# Patient Record
Sex: FEMALE | Race: WHITE | Employment: UNEMPLOYED | URBAN - METROPOLITAN AREA
[De-identification: names, ages, dates, MRNs, and addresses within clinical notes are randomized per-mention and may not be internally consistent; named-entity substitution may affect disease eponyms.]

---

## 2022-11-07 ENCOUNTER — APPOINTMENT (OUTPATIENT)
Dept: GENERAL RADIOLOGY | Age: 52
End: 2022-11-07
Attending: EMERGENCY MEDICINE

## 2022-11-07 ENCOUNTER — HOSPITAL ENCOUNTER (EMERGENCY)
Age: 52
Discharge: HOME | End: 2022-11-07
Attending: EMERGENCY MEDICINE

## 2022-11-07 VITALS
HEART RATE: 66 BPM | DIASTOLIC BLOOD PRESSURE: 84 MMHG | RESPIRATION RATE: 16 BRPM | SYSTOLIC BLOOD PRESSURE: 175 MMHG | TEMPERATURE: 97.2 F | OXYGEN SATURATION: 98 %

## 2022-11-07 DIAGNOSIS — S52.092A CLOSED COMMINUTED FRACTURE OF PROXIMAL END OF LEFT ULNA, INITIAL ENCOUNTER: Primary | ICD-10-CM

## 2022-11-07 DIAGNOSIS — T14.8XXA ABRASION: ICD-10-CM

## 2022-11-07 PROCEDURE — 99283 EMERGENCY DEPT VISIT LOW MDM: CPT

## 2022-11-07 PROCEDURE — 73080 X-RAY EXAM OF ELBOW: CPT

## 2022-11-07 PROCEDURE — 74011000250 HC RX REV CODE- 250: Performed by: EMERGENCY MEDICINE

## 2022-11-07 PROCEDURE — 73110 X-RAY EXAM OF WRIST: CPT

## 2022-11-07 PROCEDURE — 73090 X-RAY EXAM OF FOREARM: CPT

## 2022-11-07 RX ORDER — DOXYCYCLINE HYCLATE 100 MG
100 TABLET ORAL 2 TIMES DAILY
Qty: 14 TABLET | Refills: 0 | Status: SHIPPED | OUTPATIENT
Start: 2022-11-07 | End: 2022-11-14

## 2022-11-07 RX ORDER — VANCOMYCIN 1.75 GRAM/500 ML IN 0.9 % SODIUM CHLORIDE INTRAVENOUS
1750 ONCE
Status: DISCONTINUED | OUTPATIENT
Start: 2022-11-07 | End: 2022-11-07

## 2022-11-07 RX ORDER — SILVER NITRATE 38.21; 12.74 MG/1; MG/1
1 STICK TOPICAL
Status: COMPLETED | OUTPATIENT
Start: 2022-11-07 | End: 2022-11-07

## 2022-11-07 RX ORDER — HYDROCODONE BITARTRATE AND ACETAMINOPHEN 5; 325 MG/1; MG/1
1 TABLET ORAL
Qty: 18 TABLET | Refills: 0 | Status: SHIPPED | OUTPATIENT
Start: 2022-11-07 | End: 2022-11-10

## 2022-11-07 RX ADMIN — Medication 1 APPLICATOR: at 15:21

## 2022-11-07 NOTE — ED TRIAGE NOTES
Pt tripped and fell landing on outstretched hand pta, abrasion noted to wrist, c/o left wrist pain and left elbow pain, +hematoma/deformity noted to left elbow, denies los, denies neck or back pain , +radial pulse

## 2022-11-07 NOTE — CONSULTS
ORTHOPAEDIC CONSULT NOTE    Subjective:     Date of Consultation:  November 7, 2022  Referring Physician:  Yudyjoshua Canales is a 45 y.o. female with past medical history significant for hypertension as well as depression and a distant history of left radial head fracture fixation is seen for left elbow pain, swelling and bleeding following a ground-level fall today. States that she was trying to throw a ball back to a child and somehow lost her footing falling onto her left side with all of her weight landing through her left elbow. She states that this was on asphalt. She had immediate pain in the elbow and noticed early swelling. She denies having debris in the wounds but does note some bleeding since this time. She denies tingling or numbness in her arm other than near the site of her injury as she has had ice on it consistently for several hours. She was brought to the emergency department and found to have a proximal ulnar fracture. We have been asked to see her for the same. She reports pain in her elbow region and proximal forearm but denies any radiation to her wrist or up into her shoulder. She denies tingling or numbness. She denies any fevers or chills. She states that she has been taking some Advil for her discomfort and states this has given her some relief. She has no established orthopedics care at this time. .     Patient Active Problem List    Diagnosis Date Noted    Hypertension 2016    Depression 2011    ADD (attention deficit disorder) 2007     Family History   Problem Relation Age of Onset    Mult Sclerosis Mother 66    Hypertension Father     High Cholesterol Father       Social History     Tobacco Use    Smoking status: Never    Smokeless tobacco: Never   Substance Use Topics    Alcohol use:  Yes     Alcohol/week: 7.0 standard drinks     Types: 7 Glasses of wine per week     Past Medical History:   Diagnosis Date    ADD (attention deficit disorder) 2007    adderall 20mg XR; Depression 2011    Prozac 20mg     Hypertension 2016    RESOLVED, after job change, and weight loss      Past Surgical History:   Procedure Laterality Date    HX BREAST AUGMENTATION  2006    HX CYST REMOVAL Right     Ganglion from foot    HX ORTHOPAEDIC Left 2018    ELBOW     HX TUBAL LIGATION  2017    HX WISDOM TEETH EXTRACTION        Prior to Admission medications    Medication Sig Start Date End Date Taking? Authorizing Provider   HYDROcodone-acetaminophen (Norco) 5-325 mg per tablet Take 1 Tablet by mouth every four (4) hours as needed for Pain for up to 3 days. Max Daily Amount: 6 Tablets. 11/7/22 11/10/22 Yes Freddie Clement PA   doxycycline (VIBRA-TABS) 100 mg tablet Take 1 Tablet by mouth two (2) times a day for 7 days. 11/7/22 11/14/22 Yes Prabhakar Clement PA   amphetamine-dextroamphetamine XR (ADDERALL XR) 25 mg XR capsule Take 1 Capsule by mouth every morning. Max Daily Amount: 25 mg. 10/31/22   Kishore Miranda PA   FLUoxetine (PROzac) 20 mg capsule Take 1 Capsule by mouth daily. 9/26/22   Kishore Miranda PA     No current facility-administered medications for this encounter. Current Outpatient Medications   Medication Sig    HYDROcodone-acetaminophen (Norco) 5-325 mg per tablet Take 1 Tablet by mouth every four (4) hours as needed for Pain for up to 3 days. Max Daily Amount: 6 Tablets. doxycycline (VIBRA-TABS) 100 mg tablet Take 1 Tablet by mouth two (2) times a day for 7 days. amphetamine-dextroamphetamine XR (ADDERALL XR) 25 mg XR capsule Take 1 Capsule by mouth every morning. Max Daily Amount: 25 mg. FLUoxetine (PROzac) 20 mg capsule Take 1 Capsule by mouth daily. Allergies   Allergen Reactions    Amoxicillin Rash        Review of Systems:  Pertinent items are noted in HPI.     Mental Status: no dementia    Objective:     Patient Vitals for the past 8 hrs:   BP Temp Pulse Resp SpO2   11/07/22 1202 (!) 175/84 97.2 °F (36.2 °C) 66 16 98 %     Temp (24hrs), Av.2 °F (36.2 °C), Min:97.2 °F (36.2 °C), Max:97.2 °F (36.2 °C)      EXAM: Patient is awake and alert sitting in chair; appears mildly uncomfortable; agreeable to exam  Examination of the left elbow reveals marked swelling along the proximal ulnar region; there are 2 half centimeter puncture wounds in this region the more proximal of the 2 having noted blood oozing; there is no debris noted in the field there is also skin abrasions noted throughout the region as well. She has some tenderness to palpation over the site. Fracture step-off can just be felt in the proximal ulnar region. Patient unable to actively extend arm without discomfort. Moves through wrist, hand and fingers to command. Distal sensory function grossly intact. Distal pulses palpable    Imaging Review:   EXAM: XR FOREARM LT AP/LAT,     EXAM: XR ELBOW LT MIN 3 V,     EXAM: XR WRIST LT AP/LAT/OBL MIN 3V     INDICATION: fall, swelling. COMPARISON: None. FINDINGS:     Left elbow, 3 views  Acute comminuted fracture through the trochlear notch of the ulna with marked  distraction of the major fracture fragments. Overall alignment of the elbow  joint is otherwise preserved, within the limits of suboptimal positioning. Screws and 2 internal fixation of radial head fracture. Extensive soft tissue  swelling and gas surrounding the elbow, most pronounced dorsally. Left forearm, 2 views  No additional acute fracture nor dislocation. Soft tissues otherwise grossly  unremarkable. Left wrist, 3 views  No acute fracture nor dislocation. Soft tissue swelling and gas volarly may be  posttraumatic. IMPRESSION  1. Acute comminuted distracted proximal ulnar fracture. Overall alignment is  maintained. 2.  Internally fixed radial head fracture. 3.  No additional acute fracture nor dislocation of the forearm or wrist.    Labs: No results found for this or any previous visit (from the past 24 hour(s)).       Impression:     Active Problems:    * No active hospital problems.  *      Plan:     Acute comminuted fracture of left proximal ulna    Fall injury does have some associated skin abrasions and puncture wounds this does not appear to be an open fracture wounds are clean without debris; there is some gas noted on imaging but this is likely related to the her puncture wounds from asphalt  Bleeding puncture wound was treated with silver nitrate stick  Skin and injury region thoroughly cleansed and bandaged with Adaptic and nonadherent dressing  Patient placed in posterior long-arm splint and sling for support and comfort  Patient will need outpatient follow-up with Dr. Iza Campbell for surgical management of her fracture  Pain medication as needed  Have instructed to keep arm elevated    Dr. Reyes Berkeley aware of patient and in agreement with current plan of care    Emil Hernandez PA-C  Orthopedic Trauma Service  [facility]

## 2022-11-07 NOTE — ED PROVIDER NOTES
Please note that this dictation was completed with [employer], the computer voice recognition software. Quite often unanticipated grammatical, syntax, homophones, and other interpretive errors are inadvertently transcribed by the computer software. Please disregard these errors. Please excuse any errors that have escaped final proofreading. Patient is a 13-year-old female with history of hypertension, ADD, depression, presenting to ED for evaluation of left arm and elbow pain after a fall. She states that she was out in her yard and tripped and fell, landing on her outstretched left arm. Denies head injury, loss of consciousness, or anticoagulants. States that 4 years ago she broke her forearm which required surgery, this was [place] in [place], has not followed locally with an orthopedist.       Past Medical History:   Diagnosis Date    ADD (attention deficit disorder) 2007    adderall 20mg XR;     Depression 2011    Prozac 20mg     Hypertension 2016    RESOLVED, after job change, and weight loss       Past Surgical History:   Procedure Laterality Date    HX BREAST AUGMENTATION  2006    HX CYST REMOVAL Right     Ganglion from foot    HX ORTHOPAEDIC Left 2018    ELBOW     HX TUBAL LIGATION  2017    HX WISDOM TEETH EXTRACTION           Family History:   Problem Relation Age of Onset    Mult Sclerosis Mother 66    Hypertension Father     High Cholesterol Father        Social History     Socioeconomic History    Marital status:      Spouse name: Not on file    Number of children: Not on file    Years of education: Not on file    Highest education level: Not on file   Occupational History    Occupation:  for moving company   Tobacco Use    Smoking status: Never    Smokeless tobacco: Never   Vaping Use    Vaping Use: Never used   Substance and Sexual Activity    Alcohol use:  Yes     Alcohol/week: 7.0 standard drinks     Types: 7 Glasses of wine per week    Drug use: Never    Sexual activity: Not Currently   Other Topics Concern    Not on file   Social History Narrative    Not on file     Social Determinants of Health     Financial Resource Strain: Not on file   Food Insecurity: Not on file   Transportation Needs: Not on file   Physical Activity: Not on file   Stress: Not on file   Social Connections: Not on file   Intimate Partner Violence: Not on file   Housing Stability: Not on file         ALLERGIES: Amoxicillin    Review of Systems   Constitutional:  Negative for chills and fever. HENT:  Negative for congestion, ear pain and sore throat. Eyes:  Negative for visual disturbance. Respiratory:  Negative for cough and shortness of breath. Cardiovascular:  Negative for chest pain. Gastrointestinal:  Negative for abdominal pain, diarrhea, nausea and vomiting. Genitourinary:  Negative for dysuria and flank pain. Musculoskeletal:  Positive for arthralgias and joint swelling. Negative for back pain. Skin:  Negative for color change. Neurological:  Negative for dizziness and headaches. Psychiatric/Behavioral:  Negative for confusion. Vitals:    11/07/22 1202   BP: (!) 175/84   Pulse: 66   Resp: 16   Temp: 97.2 °F (36.2 °C)   SpO2: 98%            Physical Exam  Vitals and nursing note reviewed. Constitutional:       General: She is not in acute distress. Appearance: Normal appearance. She is not ill-appearing. HENT:      Head: Normocephalic and atraumatic. Eyes:      General: Vision grossly intact. Extraocular Movements: Extraocular movements intact. Conjunctiva/sclera: Conjunctivae normal.   Neck:      Trachea: Phonation normal.   Cardiovascular:      Rate and Rhythm: Normal rate and regular rhythm. Heart sounds: Normal heart sounds. Pulmonary:      Effort: Pulmonary effort is normal.      Breath sounds: Normal breath sounds and air entry. Abdominal:      Palpations: Abdomen is soft. Tenderness: There is no abdominal tenderness.    Musculoskeletal: Left shoulder: Normal.      Left elbow: Swelling present. Normal range of motion. Left forearm: Swelling, deformity, laceration (Overlying deep abrasions to left proximal forearm, bleeding controlled) and tenderness present. Left wrist: Normal. No snuff box tenderness. Normal pulse. Skin:     General: Skin is warm and dry. Neurological:      General: No focal deficit present. Mental Status: She is alert and oriented to person, place, and time. Psychiatric:         Behavior: Behavior normal.            MDM  Number of Diagnoses or Management Options  Abrasion  Closed comminuted fracture of proximal end of left ulna, initial encounter  Diagnosis management comments: Patient is alert, afebrile, vital stable. Presents ambulatory after mechanical ground-level fall prior to arrival. Left FOOSH injury, has significant swelling and deformity to the left proximal forearm with overlying abrasion. See above photo. History of ORIF to this area 4 years ago, out of town. Large area of swelling with overlying wounds to the left proximal forearm, range of motion of elbow intact. X-rays consistent with a comminuted proximal ulnar fracture. Consult placed with orthopedics (Dr. Caryn Chery, [facility]) due to concern for open fracture, [facility] has evaluated patient in ER. Recommends that wounds are superficial and not concerning for open fracture, recommends splint, abx, and outpatient follow-up. Ortho PA has placed splint and sling. No neurovascular changes after application. ED Course as of 11/07/22 1440   Mon Nov 07, 2022   1435 XR ELBOW LT MIN 3 V  FINDINGS:     Left elbow, 3 views  Acute comminuted fracture through the trochlear notch of the ulna with marked  distraction of the major fracture fragments. Overall alignment of the elbow  joint is otherwise preserved, within the limits of suboptimal positioning. Screws and 2 internal fixation of radial head fracture.  Extensive soft tissue  swelling and gas surrounding the elbow, most pronounced dorsally. Left forearm, 2 views  No additional acute fracture nor dislocation. Soft tissues otherwise grossly  unremarkable. Left wrist, 3 views  No acute fracture nor dislocation. Soft tissue swelling and gas volarly may be  posttraumatic. IMPRESSION  1. Acute comminuted distracted proximal ulnar fracture. Overall alignment is  maintained. 2.  Internally fixed radial head fracture. 3.  No additional acute fracture nor dislocation of the forearm or wrist. [EP]      ED Course User Index  [EP] Radha Clement PA     4:11 PM  Pt has been reevaluated. There are no new complaints, changes, or physical findings at this time. All results have been reviewed with patient and/or family. Medications have been reviewed w/ pt and/or family. Pt and/or family's questions have been answered. Pt and/or family expressed good understanding of the dx/tx/rx and is in agreement with plan of care. Pt instructed and agreed to f/u w/ ortho and to return to ED upon further deterioration. Return precautions outlined. All questions answered at this time. Pt is stable and ready for discharge. IMPRESSION:  1. Closed comminuted fracture of proximal end of left ulna, initial encounter    2. Abrasion        PLAN:  1. Current Discharge Medication List        START taking these medications    Details   HYDROcodone-acetaminophen (Norco) 5-325 mg per tablet Take 1 Tablet by mouth every four (4) hours as needed for Pain for up to 3 days. Max Daily Amount: 6 Tablets. Qty: 18 Tablet, Refills: 0  Start date: 11/7/2022, End date: 11/10/2022    Associated Diagnoses: Closed comminuted fracture of proximal end of left ulna, initial encounter      doxycycline (VIBRA-TABS) 100 mg tablet Take 1 Tablet by mouth two (2) times a day for 7 days. Qty: 14 Tablet, Refills: 0  Start date: 11/7/2022, End date: 11/14/2022           2.    Follow-up Information       Follow up With Specialties Details Why Contact Info    Charlie Rosa MD Orthopedic Surgery Schedule an appointment as soon as possible for a visit   [phone] [phone]                 Return to ED if worse     Procedures

## 2022-11-08 ENCOUNTER — OFFICE VISIT (OUTPATIENT)
Dept: ORTHOPEDIC SURGERY | Age: 52
End: 2022-11-08

## 2022-11-08 VITALS — BODY MASS INDEX: 25.01 KG/M2 | HEIGHT: 68 IN | WEIGHT: 165 LBS

## 2022-11-08 DIAGNOSIS — S52.022A CLOSED FRACTURE OF OLECRANON PROCESS OF LEFT ULNA, INITIAL ENCOUNTER: Primary | ICD-10-CM

## 2022-11-08 PROCEDURE — 99203 OFFICE O/P NEW LOW 30 MIN: CPT | Performed by: ORTHOPAEDIC SURGERY

## 2022-11-10 ENCOUNTER — ANESTHESIA EVENT (OUTPATIENT)
Dept: MEDSURG UNIT | Age: 52
End: 2022-11-10

## 2022-11-10 ENCOUNTER — HOSPITAL ENCOUNTER (OUTPATIENT)
Age: 52
Setting detail: OUTPATIENT SURGERY
Discharge: HOME | End: 2022-11-10
Attending: ORTHOPAEDIC SURGERY | Admitting: ORTHOPAEDIC SURGERY

## 2022-11-10 ENCOUNTER — ANESTHESIA (OUTPATIENT)
Dept: MEDSURG UNIT | Age: 52
End: 2022-11-10

## 2022-11-10 VITALS
HEART RATE: 79 BPM | OXYGEN SATURATION: 96 % | HEIGHT: 68 IN | DIASTOLIC BLOOD PRESSURE: 88 MMHG | BODY MASS INDEX: 25.98 KG/M2 | SYSTOLIC BLOOD PRESSURE: 133 MMHG | WEIGHT: 171.4 LBS | RESPIRATION RATE: 16 BRPM | TEMPERATURE: 98.4 F

## 2022-11-10 DIAGNOSIS — S52.022A CLOSED FRACTURE OF OLECRANON PROCESS OF LEFT ULNA, INITIAL ENCOUNTER: Primary | ICD-10-CM

## 2022-11-10 PROCEDURE — 99024 POSTOP FOLLOW-UP VISIT: CPT | Performed by: ORTHOPAEDIC SURGERY

## 2022-11-10 PROCEDURE — 77030002986 HC SUT PROL J&J -A: Performed by: ORTHOPAEDIC SURGERY

## 2022-11-10 PROCEDURE — 74011250636 HC RX REV CODE- 250/636: Performed by: NURSE ANESTHETIST, CERTIFIED REGISTERED

## 2022-11-10 PROCEDURE — 77030040922 HC BLNKT HYPOTHRM STRY -A

## 2022-11-10 PROCEDURE — 76210000035 HC AMBSU PH I REC 1 TO 1.5 HR: Performed by: ORTHOPAEDIC SURGERY

## 2022-11-10 PROCEDURE — 74011000250 HC RX REV CODE- 250: Performed by: ORTHOPAEDIC SURGERY

## 2022-11-10 PROCEDURE — C1713 ANCHOR/SCREW BN/BN,TIS/BN: HCPCS | Performed by: ORTHOPAEDIC SURGERY

## 2022-11-10 PROCEDURE — 74011250636 HC RX REV CODE- 250/636: Performed by: ANESTHESIOLOGY

## 2022-11-10 PROCEDURE — 77030003601 HC NDL NRV BLK BBMI -A

## 2022-11-10 PROCEDURE — 77030031139 HC SUT VCRL2 J&J -A: Performed by: ORTHOPAEDIC SURGERY

## 2022-11-10 PROCEDURE — 77030000031 HC BIT DRL QC SYNT -C: Performed by: ORTHOPAEDIC SURGERY

## 2022-11-10 PROCEDURE — 76030000001 HC AMB SURG OR TIME 1 TO 1.5: Performed by: ORTHOPAEDIC SURGERY

## 2022-11-10 PROCEDURE — 74011250636 HC RX REV CODE- 250/636: Performed by: ORTHOPAEDIC SURGERY

## 2022-11-10 PROCEDURE — 76060000062 HC AMB SURG ANES 1 TO 1.5 HR: Performed by: ORTHOPAEDIC SURGERY

## 2022-11-10 PROCEDURE — 77030018673: Performed by: ORTHOPAEDIC SURGERY

## 2022-11-10 PROCEDURE — 74011000250 HC RX REV CODE- 250: Performed by: NURSE ANESTHETIST, CERTIFIED REGISTERED

## 2022-11-10 PROCEDURE — 77030040356 HC CORD BPLR FRCP COVD -A: Performed by: ORTHOPAEDIC SURGERY

## 2022-11-10 PROCEDURE — 24685 OPTX ULNAR FX PROX END W/FIX: CPT | Performed by: ORTHOPAEDIC SURGERY

## 2022-11-10 PROCEDURE — 2709999900 HC NON-CHARGEABLE SUPPLY: Performed by: ORTHOPAEDIC SURGERY

## 2022-11-10 PROCEDURE — A4565 SLINGS: HCPCS | Performed by: ORTHOPAEDIC SURGERY

## 2022-11-10 PROCEDURE — 77030010396 HC WRE FIX C CNMD -A: Performed by: ORTHOPAEDIC SURGERY

## 2022-11-10 PROCEDURE — 77030003862 HC BIT DRL SYNT -B: Performed by: ORTHOPAEDIC SURGERY

## 2022-11-10 PROCEDURE — 77030020754 HC CUF TRNQT 2BLA STRY -B: Performed by: ORTHOPAEDIC SURGERY

## 2022-11-10 PROCEDURE — 2709999900 HC NON-CHARGEABLE SUPPLY

## 2022-11-10 DEVICE — SCREW BNE L22MM DIA2.7MM ANK S STL ST VAR ANG LOK FULL THRD: Type: IMPLANTABLE DEVICE | Site: ELBOW | Status: FUNCTIONAL

## 2022-11-10 DEVICE — K WIRE FIX L150MM DIA1.6MM S STL TRCR PNT: Type: IMPLANTABLE DEVICE | Status: FUNCTIONAL

## 2022-11-10 DEVICE — SCREW BNE L10MM DIA3.5MM CORT S STL ST NONCANNULATED LOK: Type: IMPLANTABLE DEVICE | Site: ELBOW | Status: FUNCTIONAL

## 2022-11-10 DEVICE — SCREW BNE L44MM DIA2.7MM ANK S STL ST VAR ANG LOK FULL THRD: Type: IMPLANTABLE DEVICE | Site: ELBOW | Status: FUNCTIONAL

## 2022-11-10 DEVICE — SCREW BNE L30MM DIA2.7MM ANK S STL ST VAR ANG LOK FULL THRD: Type: IMPLANTABLE DEVICE | Site: ELBOW | Status: FUNCTIONAL

## 2022-11-10 DEVICE — PLATE BNE L90MM 2 H NONSTERILE L OLECRANON S STL LO PROF: Type: IMPLANTABLE DEVICE | Site: ELBOW | Status: FUNCTIONAL

## 2022-11-10 DEVICE — SCREW BNE L22MM DIA3.5MM CORT S STL ST NONCANNULATED LOK: Type: IMPLANTABLE DEVICE | Site: ELBOW | Status: FUNCTIONAL

## 2022-11-10 DEVICE — SCREW BNE L16MM DIA2.7MM ANK S STL ST VAR ANG LOK FULL THRD: Type: IMPLANTABLE DEVICE | Site: ELBOW | Status: FUNCTIONAL

## 2022-11-10 DEVICE — SCREW BNE L32MM DIA2.7MM S STL ST VAR ANG LOK FULL THRD T8: Type: IMPLANTABLE DEVICE | Site: ELBOW | Status: FUNCTIONAL

## 2022-11-10 RX ORDER — LIDOCAINE HYDROCHLORIDE 10 MG/ML
0.1 INJECTION, SOLUTION EPIDURAL; INFILTRATION; INTRACAUDAL; PERINEURAL AS NEEDED
Status: DISCONTINUED | OUTPATIENT
Start: 2022-11-10 | End: 2022-11-10 | Stop reason: HOSPADM

## 2022-11-10 RX ORDER — FENTANYL CITRATE 50 UG/ML
50 INJECTION, SOLUTION INTRAMUSCULAR; INTRAVENOUS AS NEEDED
Status: DISCONTINUED | OUTPATIENT
Start: 2022-11-10 | End: 2022-11-10 | Stop reason: HOSPADM

## 2022-11-10 RX ORDER — IBUPROFEN 800 MG/1
800 TABLET ORAL
Qty: 30 TABLET | Refills: 0 | Status: SHIPPED | OUTPATIENT
Start: 2022-11-10

## 2022-11-10 RX ORDER — LIDOCAINE HYDROCHLORIDE 20 MG/ML
INJECTION, SOLUTION EPIDURAL; INFILTRATION; INTRACAUDAL; PERINEURAL AS NEEDED
Status: DISCONTINUED | OUTPATIENT
Start: 2022-11-10 | End: 2022-11-10 | Stop reason: HOSPADM

## 2022-11-10 RX ORDER — SODIUM CHLORIDE 9 MG/ML
1000 INJECTION, SOLUTION INTRAVENOUS CONTINUOUS
Status: DISCONTINUED | OUTPATIENT
Start: 2022-11-10 | End: 2022-11-10 | Stop reason: HOSPADM

## 2022-11-10 RX ORDER — HYDROMORPHONE HYDROCHLORIDE 1 MG/ML
0.2 INJECTION, SOLUTION INTRAMUSCULAR; INTRAVENOUS; SUBCUTANEOUS
Status: DISCONTINUED | OUTPATIENT
Start: 2022-11-10 | End: 2022-11-10 | Stop reason: HOSPADM

## 2022-11-10 RX ORDER — SODIUM CHLORIDE 0.9 % (FLUSH) 0.9 %
5-40 SYRINGE (ML) INJECTION AS NEEDED
Status: DISCONTINUED | OUTPATIENT
Start: 2022-11-10 | End: 2022-11-10 | Stop reason: HOSPADM

## 2022-11-10 RX ORDER — ROPIVACAINE HYDROCHLORIDE 5 MG/ML
INJECTION, SOLUTION EPIDURAL; INFILTRATION; PERINEURAL
Status: COMPLETED | OUTPATIENT
Start: 2022-11-10 | End: 2022-11-10

## 2022-11-10 RX ORDER — ACETAMINOPHEN 325 MG/1
650 TABLET ORAL ONCE
Status: DISCONTINUED | OUTPATIENT
Start: 2022-11-10 | End: 2022-11-10 | Stop reason: HOSPADM

## 2022-11-10 RX ORDER — OXYCODONE AND ACETAMINOPHEN 5; 325 MG/1; MG/1
1 TABLET ORAL
Qty: 20 TABLET | Refills: 0 | Status: SHIPPED | OUTPATIENT
Start: 2022-11-10 | End: 2022-11-17

## 2022-11-10 RX ORDER — PROPOFOL 10 MG/ML
INJECTION, EMULSION INTRAVENOUS AS NEEDED
Status: DISCONTINUED | OUTPATIENT
Start: 2022-11-10 | End: 2022-11-10 | Stop reason: HOSPADM

## 2022-11-10 RX ORDER — SUCCINYLCHOLINE CHLORIDE 20 MG/ML
INJECTION INTRAMUSCULAR; INTRAVENOUS AS NEEDED
Status: DISCONTINUED | OUTPATIENT
Start: 2022-11-10 | End: 2022-11-10 | Stop reason: HOSPADM

## 2022-11-10 RX ORDER — MIDAZOLAM HYDROCHLORIDE 1 MG/ML
1 INJECTION, SOLUTION INTRAMUSCULAR; INTRAVENOUS AS NEEDED
Status: DISCONTINUED | OUTPATIENT
Start: 2022-11-10 | End: 2022-11-10 | Stop reason: HOSPADM

## 2022-11-10 RX ORDER — FENTANYL CITRATE 50 UG/ML
25 INJECTION, SOLUTION INTRAMUSCULAR; INTRAVENOUS
Status: DISCONTINUED | OUTPATIENT
Start: 2022-11-10 | End: 2022-11-10 | Stop reason: HOSPADM

## 2022-11-10 RX ORDER — MORPHINE SULFATE 2 MG/ML
2 INJECTION, SOLUTION INTRAMUSCULAR; INTRAVENOUS
Status: DISCONTINUED | OUTPATIENT
Start: 2022-11-10 | End: 2022-11-10 | Stop reason: HOSPADM

## 2022-11-10 RX ORDER — DIPHENHYDRAMINE HYDROCHLORIDE 50 MG/ML
12.5 INJECTION, SOLUTION INTRAMUSCULAR; INTRAVENOUS AS NEEDED
Status: DISCONTINUED | OUTPATIENT
Start: 2022-11-10 | End: 2022-11-10 | Stop reason: HOSPADM

## 2022-11-10 RX ORDER — MIDAZOLAM HYDROCHLORIDE 1 MG/ML
0.5 INJECTION, SOLUTION INTRAMUSCULAR; INTRAVENOUS
Status: DISCONTINUED | OUTPATIENT
Start: 2022-11-10 | End: 2022-11-10 | Stop reason: HOSPADM

## 2022-11-10 RX ORDER — SODIUM CHLORIDE, SODIUM LACTATE, POTASSIUM CHLORIDE, CALCIUM CHLORIDE 600; 310; 30; 20 MG/100ML; MG/100ML; MG/100ML; MG/100ML
125 INJECTION, SOLUTION INTRAVENOUS CONTINUOUS
Status: DISCONTINUED | OUTPATIENT
Start: 2022-11-10 | End: 2022-11-10 | Stop reason: HOSPADM

## 2022-11-10 RX ORDER — ONDANSETRON 2 MG/ML
INJECTION INTRAMUSCULAR; INTRAVENOUS AS NEEDED
Status: DISCONTINUED | OUTPATIENT
Start: 2022-11-10 | End: 2022-11-10 | Stop reason: HOSPADM

## 2022-11-10 RX ORDER — SODIUM CHLORIDE, SODIUM LACTATE, POTASSIUM CHLORIDE, CALCIUM CHLORIDE 600; 310; 30; 20 MG/100ML; MG/100ML; MG/100ML; MG/100ML
INJECTION, SOLUTION INTRAVENOUS
Status: DISCONTINUED | OUTPATIENT
Start: 2022-11-10 | End: 2022-11-10 | Stop reason: HOSPADM

## 2022-11-10 RX ORDER — SODIUM CHLORIDE 0.9 % (FLUSH) 0.9 %
5-40 SYRINGE (ML) INJECTION EVERY 8 HOURS
Status: DISCONTINUED | OUTPATIENT
Start: 2022-11-10 | End: 2022-11-10 | Stop reason: HOSPADM

## 2022-11-10 RX ORDER — DEXAMETHASONE SODIUM PHOSPHATE 4 MG/ML
INJECTION, SOLUTION INTRA-ARTICULAR; INTRALESIONAL; INTRAMUSCULAR; INTRAVENOUS; SOFT TISSUE AS NEEDED
Status: DISCONTINUED | OUTPATIENT
Start: 2022-11-10 | End: 2022-11-10 | Stop reason: HOSPADM

## 2022-11-10 RX ORDER — ROCURONIUM BROMIDE 10 MG/ML
INJECTION, SOLUTION INTRAVENOUS AS NEEDED
Status: DISCONTINUED | OUTPATIENT
Start: 2022-11-10 | End: 2022-11-10 | Stop reason: HOSPADM

## 2022-11-10 RX ORDER — OXYCODONE AND ACETAMINOPHEN 5; 325 MG/1; MG/1
1 TABLET ORAL AS NEEDED
Status: DISCONTINUED | OUTPATIENT
Start: 2022-11-10 | End: 2022-11-10 | Stop reason: HOSPADM

## 2022-11-10 RX ORDER — DEXMEDETOMIDINE HYDROCHLORIDE 100 UG/ML
INJECTION, SOLUTION INTRAVENOUS AS NEEDED
Status: DISCONTINUED | OUTPATIENT
Start: 2022-11-10 | End: 2022-11-10 | Stop reason: HOSPADM

## 2022-11-10 RX ORDER — SODIUM CHLORIDE 9 MG/ML
50 INJECTION, SOLUTION INTRAVENOUS CONTINUOUS
Status: DISCONTINUED | OUTPATIENT
Start: 2022-11-10 | End: 2022-11-10 | Stop reason: HOSPADM

## 2022-11-10 RX ORDER — LABETALOL HYDROCHLORIDE 5 MG/ML
INJECTION, SOLUTION INTRAVENOUS AS NEEDED
Status: DISCONTINUED | OUTPATIENT
Start: 2022-11-10 | End: 2022-11-10 | Stop reason: HOSPADM

## 2022-11-10 RX ORDER — ONDANSETRON 2 MG/ML
4 INJECTION INTRAMUSCULAR; INTRAVENOUS AS NEEDED
Status: DISCONTINUED | OUTPATIENT
Start: 2022-11-10 | End: 2022-11-10 | Stop reason: HOSPADM

## 2022-11-10 RX ADMIN — DEXAMETHASONE SODIUM PHOSPHATE 8 MG: 4 INJECTION, SOLUTION INTRAMUSCULAR; INTRAVENOUS at 07:56

## 2022-11-10 RX ADMIN — PROPOFOL 10 MG: 10 INJECTION, EMULSION INTRAVENOUS at 07:48

## 2022-11-10 RX ADMIN — ROPIVACAINE HYDROCHLORIDE 30 ML: 5 INJECTION, SOLUTION EPIDURAL; INFILTRATION; PERINEURAL at 07:33

## 2022-11-10 RX ADMIN — FENTANYL CITRATE 100 MCG: 50 INJECTION, SOLUTION INTRAMUSCULAR; INTRAVENOUS at 07:31

## 2022-11-10 RX ADMIN — LABETALOL HYDROCHLORIDE 5 MG: 5 INJECTION INTRAVENOUS at 08:02

## 2022-11-10 RX ADMIN — ROCURONIUM BROMIDE 5 MG: 10 SOLUTION INTRAVENOUS at 07:48

## 2022-11-10 RX ADMIN — SODIUM CHLORIDE, POTASSIUM CHLORIDE, SODIUM LACTATE AND CALCIUM CHLORIDE: 600; 310; 30; 20 INJECTION, SOLUTION INTRAVENOUS at 07:29

## 2022-11-10 RX ADMIN — DEXMEDETOMIDINE HYDROCHLORIDE 4 MCG: 100 INJECTION, SOLUTION, CONCENTRATE INTRAVENOUS at 08:21

## 2022-11-10 RX ADMIN — MIDAZOLAM 2 MG: 1 INJECTION INTRAMUSCULAR; INTRAVENOUS at 07:31

## 2022-11-10 RX ADMIN — ONDANSETRON HYDROCHLORIDE 4 MG: 2 INJECTION, SOLUTION INTRAMUSCULAR; INTRAVENOUS at 08:18

## 2022-11-10 RX ADMIN — SUCCINYLCHOLINE CHLORIDE 140 MG: 20 INJECTION, SOLUTION INTRAMUSCULAR; INTRAVENOUS at 07:48

## 2022-11-10 RX ADMIN — LIDOCAINE HYDROCHLORIDE 60 MG: 20 INJECTION, SOLUTION EPIDURAL; INFILTRATION; INTRACAUDAL; PERINEURAL at 07:48

## 2022-11-10 RX ADMIN — SODIUM CHLORIDE 40 MCG/MIN: 9 INJECTION, SOLUTION INTRAVENOUS at 08:14

## 2022-11-10 RX ADMIN — ROCURONIUM BROMIDE 25 MG: 10 SOLUTION INTRAVENOUS at 07:52

## 2022-11-10 RX ADMIN — WATER 2 G: 1 INJECTION INTRAMUSCULAR; INTRAVENOUS; SUBCUTANEOUS at 07:53

## 2022-11-10 NOTE — PERIOP NOTES
I have reviewed discharge instructions with the caregiver- rafat. The caregiver-rafat verbalized understanding. All questions addressed at this time. A paper copy of these instructions have been given to the patient to take home.

## 2022-11-10 NOTE — OP NOTES
Operative Report    Patient: Mely Manning MRN: [id number]  SSN: [id number]    YOB: 1970  Age: 45 y.o. Sex: female       Date of Surgery: 11/10/2022     Preoperative Diagnosis: CLOSED FRACTURE OF OLECRANON PROCESSED OF LEFT ULNAR     Postoperative Diagnosis: Left closed olecranon fracture    Surgeon(s) and Role:     * Too Zhang MD - Primary    Anesthesia: Other     Procedure: Procedure(s):  LEFT OLECRANON OPEN REDUCTION INTERNAL FIXATION (ANES. CHOICE, BLOCK)     Procedure in Detail: Patient was identified preoperatively and the operative site was marked. We again reviewed risks, benefits and alternatives and the patient elected proceed with operative intervention. We discussed risks, benefits and alternatives to surgery. After thorough discussion ultimately the patient elected to proceed with operative intervention. We discussed the risks including but not limited to postoperative pain, swelling, bruising, bleeding, scarring, infection, damage to neurovascular structures. We also discussed permanent or temporary loss of range of motion, need for additional surgery, rejection of foreign material such as metal, suture or other tissue. We discussed risk of blood clots. The patient was brought back to the operative suite placed supine on the operative table with all bony prominences well-padded. The operative extremity was prepped and draped in standard sterile fashion. Timeout was performed confirming correct patient procedure site and laterality, all were in agreement. 2 g Ancef antibiotics were administered. I used Esmarch bandage to exsanguinate the upper extremity. Tourniquet was insufflated 250 mmHg. The arm was brought across her chest and I made a posterior incision over the olecranon gently curving away from the tip of the olecranon towards the lateral side. I carefully dissected through skin and subcutaneous tissue elevated full-thickness skin flaps.   I exposed the fracture and elevated tissue off of the fracture site. The fracture was thoroughly irrigated and hematoma was removed. At this time I then provisionally reduce the fracture and held into position with combination of clamps and K wires. I then provisionally placed my plate after obtaining reduction and pinned into position. I confirmed this reduction using fluoroscopy. I then placed a cortical screw through the distal fragment. Followed by locking screws through the proximal fragment. Once I was satisfied that I had maintained the alignment using fluoroscopy I then placed additional cortical screws distally and additional locking screws in the proximal fragment. Elbow was taken through range of motion and felt to be smooth. Fluoroscopy confirmed appropriate alignment of the fracture as well as appropriate hardware placement. Wound was then thoroughly irrigated with normal saline. Subcutaneous tissue was closed with a 2-0 Vicryl followed by 3-0 Monocryl for the skin in subcuticular fashion. Steri-Strips followed by Xeroform, Webril and a long-arm posterior splint were applied. This was wrapped with Ace bandage. Tourniquet was let down and digits pinked up readily. Patient tolerated the procedure well transfer the PACU in stable condition. Estimated Blood Loss:  < 5 cc    Tourniquet Time: 49 minutes    Implants:   Implant Name Type Inv.  Item Serial No.  Lot No. LRB No. Used Action   PLATE BNE [id number] 2 H NONSTERILE L OLECRANON S STL LO PROF - SNA  PLATE BNE [id number] 2 H NONSTERILE L OLECRANON S STL LO PROF NA DEPUY [employer] [facility]  Left 1 Implanted   SCREW BNE L22MM DIA3.5MM TERESO S STL ST NONCANNULATED IVA - SNA  SCREW BNE L22MM DIA3.5MM TERESO S STL ST NONCANNULATED IVA NA DEPUY [employer] [facility]  Left 1 Implanted   SCREW BNE L10MM DIA3.5MM TERESO S STL ST NONCANNULATED IVA - SNA  SCREW BNE L10MM DIA3.5MM TERESO S STL ST NONCANNULATED IVA NA DEPUY [employer] [facility]  Left 1 Implanted   SCREW BNE L44MM DIA2.7MM ANK S STL ST [place] IVA FULL THRD - SNA  SCREW BNE L44MM DIA2.7MM ANK S STL ST [place] IVA FULL THRD NA DEPUY [employer] [facility]  Left 2 Implanted   SCREW BNE L30MM DIA2.7MM ANK S STL ST [place] IVA FULL THRD - SNA  SCREW BNE L30MM DIA2.7MM ANK S STL ST [place] IVA FULL THRD NA DEPUY [employer] [facility]  Left 1 Implanted   SCREW BNE L32MM DIA2.7MM S STL ST [place] IVA FULL THRD T8 - SNA  SCREW BNE L32MM DIA2.7MM S STL ST [place] IVA FULL THRD T8 NA DEPUY [employer] [facility]  Left 1 Implanted   SCREW BNE L16MM DIA2.7MM ANK S STL ST [place] IVA FULL THRD - SNA  SCREW BNE L16MM DIA2.7MM ANK S STL ST [place] IVA FULL THRD NA DEPUY [employer] [facility]  Left 1 Implanted   SCREW BNE L22MM DIA2.7MM ANK S STL ST [place] IVA FULL THRD - SNA  SCREW BNE L22MM DIA2.7MM ANK S STL ST [place] IVA FULL THRD NA DEPUY [employer] [facility]  Left 1 Implanted               Specimens: * No specimens in log *        Drains: None                Complications: None    Counts: Sponge and needle counts were correct times two.     Signed By:  Akira Jones MD     November 10, 2022

## 2022-11-10 NOTE — H&P
Patient seen and examined this morning with no changes. Ready to proceed with surgery. Jenna Mac (: 1970) is a 45 y.o. female patient here for evaluation of the following chief complaint(s):  Hand Pain (Left ulnar fracture) and Wrist Pain (Left ulnar fracture)        ASSESSMENT/PLAN:  Below is the assessment and plan developed based on review of pertinent history, physical exam, labs, studies, and medications. 1. Closed fracture of olecranon process of left ulna, initial encounter     I thoroughly reviewed treatment options with the patient and her . I discussed open reduction internal fixation for this problem. They were in agreement. All questions were answered. We discussed risks, benefits and alternatives to surgery. After thorough discussion ultimately the patient elected to proceed with operative intervention. We discussed the risks including but not limited to postoperative pain, swelling, bruising, bleeding, scarring, infection, damage to neurovascular structures. Risk of permanent or temporary loss of range of motion, need for additional surgery, rejection of foreign material such as metal, suture or other tissue. Plan is for left olecranon open reduction internal fixation. Patient verbalized understanding and elected to proceed. All questions were answered to the patient's apparent satisfaction. SUBJECTIVE/OBJECTIVE:  HPI     70-year-old female who sustained a fall yesterday in a gravel parking lot injuring her left elbow. She was seen in the emergency department where x-rays were obtained which show displaced olecranon fracture. Patient was examined by the orthopedic PA on-call and determine the abrasions and lacerations were superficial without concern for open fracture. Patient was splinted and then referred here. She reports pain is manageable, no numbness or tingling. Patient reports a sudden onset of symptoms.    Duration of problem 1 day, 11/7/2022. Symptom Severity 3/10  Symptom Frequency intermittent                Allergies   Allergen Reactions    Amoxicillin Rash              Current Outpatient Medications   Medication Sig    HYDROcodone-acetaminophen (Norco) 5-325 mg per tablet Take 1 Tablet by mouth every four (4) hours as needed for Pain for up to 3 days. Max Daily Amount: 6 Tablets. doxycycline (VIBRA-TABS) 100 mg tablet Take 1 Tablet by mouth two (2) times a day for 7 days. amphetamine-dextroamphetamine XR (ADDERALL XR) 25 mg XR capsule Take 1 Capsule by mouth every morning. Max Daily Amount: 25 mg. FLUoxetine (PROzac) 20 mg capsule Take 1 Capsule by mouth daily. No current facility-administered medications for this visit. Social History            Socioeconomic History    Marital status:        Spouse name: Not on file    Number of children: Not on file    Years of education: Not on file    Highest education level: Not on file   Occupational History    Occupation:  for [employer] Use    Smoking status: Never    Smokeless tobacco: Never   Vaping Use    Vaping Use: Never used   Substance and Sexual Activity    Alcohol use:  Yes       Alcohol/week: 7.0 standard drinks       Types: 7 Glasses of wine per week    Drug use: Never    Sexual activity: Not Currently   Other Topics Concern    Not on file   Social History Narrative    Not on file      Social Determinants of Health      Financial Resource Strain: Not on file   Food Insecurity: Not on file   Transportation Needs: Not on file   Physical Activity: Not on file   Stress: Not on file   Social Connections: Not on file   Intimate Partner Violence: Not on file   Housing Stability: Not on file               Past Surgical History:   Procedure Laterality Date    HX BREAST AUGMENTATION   2006    HX CYST REMOVAL Right       Ganglion from foot    HX ORTHOPAEDIC Left 2018     ELBOW     HX TUBAL LIGATION   2017    HX WISDOM TEETH EXTRACTION Family History   Problem Relation Age of Onset    Mult Sclerosis Mother 66    Hypertension Father      High Cholesterol Father                 Review of Systems     No flowsheet data found. Vitals:  Ht 5' 8\" (1.727 m)   Wt 165 lb (74.8 kg)   BMI 25.09 kg/m²    Estimated body surface area is 1.89 meters squared as calculated from the following:    Height as of this encounter: 5' 8\" (1.727 m). Weight as of this encounter: 165 lb (74.8 kg). Body mass index is 25.09 kg/m². Physical Exam     Musculoskeletal Exam:     Left Upper Extremity EXAMINATION     Patient has tenderness to palpation at the olecranon, no tenderness over the humerus or shoulder, no tenderness in the distal forearm or wrist or hand. Patient fires AIN, PIN and ulnar nerves. Sensation is grossly intact in the median, radial and ulnar distribution. Hand is pink and appears well-perfused. Hand is warm. Skin is intact. Compartments are soft and compressible. Consitutional: Healthy  Skin:   - Edema - mild  - Cellulitis - No     Neuro: Numbness or tingling in R/L arm: No     Psych: Affect normal     Cardiovascular: Capillary Refill < 2 seconds in upper extremities     Respiratory: Non-Labored Breathing     ROS:     Constitutional: Denies fever/chills     Respiratory: Denies SOB           Imaging:     XR Results (maximum last 3): Results from Hospital Encounter encounter on 11/07/22     XR FOREARM LT AP/LAT     Narrative  EXAM: XR FOREARM LT AP/LAT,     EXAM: XR ELBOW LT MIN 3 V,     EXAM: XR WRIST LT AP/LAT/OBL MIN 3V     INDICATION: fall, swelling. COMPARISON: None. FINDINGS:     Left elbow, 3 views  Acute comminuted fracture through the trochlear notch of the ulna with marked  distraction of the major fracture fragments. Overall alignment of the elbow  joint is otherwise preserved, within the limits of suboptimal positioning. Screws and 2 internal fixation of radial head fracture.  Extensive soft tissue  swelling and gas surrounding the elbow, most pronounced dorsally. Left forearm, 2 views  No additional acute fracture nor dislocation. Soft tissues otherwise grossly  unremarkable. Left wrist, 3 views  No acute fracture nor dislocation. Soft tissue swelling and gas volarly may be  posttraumatic. Impression  1. Acute comminuted distracted proximal ulnar fracture. Overall alignment is  maintained. 2.  Internally fixed radial head fracture. 3.  No additional acute fracture nor dislocation of the forearm or wrist.        XR WRIST LT AP/LAT/OBL MIN 3V     Narrative  EXAM: XR FOREARM LT AP/LAT,     EXAM: XR ELBOW LT MIN 3 V,     EXAM: XR WRIST LT AP/LAT/OBL MIN 3V     INDICATION: fall, swelling. COMPARISON: None. FINDINGS:     Left elbow, 3 views  Acute comminuted fracture through the trochlear notch of the ulna with marked  distraction of the major fracture fragments. Overall alignment of the elbow  joint is otherwise preserved, within the limits of suboptimal positioning. Screws and 2 internal fixation of radial head fracture. Extensive soft tissue  swelling and gas surrounding the elbow, most pronounced dorsally. Left forearm, 2 views  No additional acute fracture nor dislocation. Soft tissues otherwise grossly  unremarkable. Left wrist, 3 views  No acute fracture nor dislocation. Soft tissue swelling and gas volarly may be  posttraumatic. Impression  1. Acute comminuted distracted proximal ulnar fracture. Overall alignment is  maintained. 2.  Internally fixed radial head fracture. 3.  No additional acute fracture nor dislocation of the forearm or wrist.        XR ELBOW LT MIN 3 V     Narrative  EXAM: XR FOREARM LT AP/LAT,     EXAM: XR ELBOW LT MIN 3 V,     EXAM: XR WRIST LT AP/LAT/OBL MIN 3V     INDICATION: fall, swelling. COMPARISON: None.      FINDINGS:     Left elbow, 3 views  Acute comminuted fracture through the trochlear notch of the ulna with marked  distraction of the major fracture fragments. Overall alignment of the elbow  joint is otherwise preserved, within the limits of suboptimal positioning. Screws and 2 internal fixation of radial head fracture. Extensive soft tissue  swelling and gas surrounding the elbow, most pronounced dorsally. Left forearm, 2 views  No additional acute fracture nor dislocation. Soft tissues otherwise grossly  unremarkable. Left wrist, 3 views  No acute fracture nor dislocation. Soft tissue swelling and gas volarly may be  posttraumatic. Impression  1. Acute comminuted distracted proximal ulnar fracture. Overall alignment is  maintained. 2.  Internally fixed radial head fracture. 3.  No additional acute fracture nor dislocation of the forearm or wrist.           An electronic signature was used to authenticate this note.   -- Terrie Sanders MD

## 2022-11-10 NOTE — ANESTHESIA PROCEDURE NOTES
Peripheral Block    Performed by: Jerilyn Ridley DO  Authorized by: Jerilyn Ridley DO       Pre-procedure:    Indications: at surgeon's request and post-op pain management    Preanesthetic Checklist: patient identified, risks and benefits discussed, site marked, timeout performed, anesthesia consent given and patient being monitored      Block Type:   Block Type:  Supraclavicular  Laterality:  Left  Monitoring:  Standard ASA monitoring, continuous pulse ox, frequent vital sign checks, heart rate, responsive to questions and oxygen  Injection Technique:  Single shot  Procedures: ultrasound guided and nerve stimulator    Patient Position: supine  Prep: chlorhexidine    Location:  Supraclavicular  Needle Type:  Stimuplex  Needle Gauge:  22 G  Needle Localization:  Ultrasound guidance  Medication Injected:  Ropivacaine (PF) (NAROPIN)(0.5%) 5 mg/mL injection - Peripheral Nerve Block   30 mL - 11/10/2022 7:33:00 AM  Med Admin Time: 11/10/2022 7:33 AM    Assessment:  Number of attempts:  1  Injection Assessment:  Incremental injection every 5 mL, local visualized surrounding nerve on ultrasound, negative aspiration for blood, no paresthesia, negative aspiration for CSF and no intravascular symptoms  Patient tolerance:  Patient tolerated the procedure well with no immediate complications

## 2022-11-10 NOTE — ANESTHESIA PREPROCEDURE EVALUATION
Relevant Problems   NEUROLOGY   (+) ADD (attention deficit disorder)   (+) Depression      CARDIOVASCULAR   (+) Hypertension       Anesthetic History   No history of anesthetic complications            Review of Systems / Medical History  Patient summary reviewed, nursing notes reviewed and pertinent labs reviewed    Pulmonary  Within defined limits                 Neuro/Psych   Within defined limits           Cardiovascular    Hypertension                   GI/Hepatic/Renal  Within defined limits              Endo/Other  Within defined limits           Other Findings              Physical Exam    Airway  Mallampati: II  TM Distance: > 6 cm  Neck ROM: normal range of motion   Mouth opening: Normal     Cardiovascular  Regular rate and rhythm,  S1 and S2 normal,  no murmur, click, rub, or gallop             Dental  No notable dental hx       Pulmonary  Breath sounds clear to auscultation               Abdominal  GI exam deferred       Other Findings            Anesthetic Plan    ASA: 2  Anesthesia type: general and regional      Post-op pain plan if not by surgeon: peripheral nerve block single    Induction: Intravenous  Anesthetic plan and risks discussed with: Patient

## 2022-11-10 NOTE — ANESTHESIA POSTPROCEDURE EVALUATION
Procedure(s):  LEFT OLECRANON OPEN REDUCTION INTERNAL FIXATION (ANES. CHOICE, BLOCK). general    Anesthesia Post Evaluation      Multimodal analgesia: multimodal analgesia used between 6 hours prior to anesthesia start to PACU discharge  Patient location during evaluation: PACU  Patient participation: complete - patient participated  Level of consciousness: awake  Pain score: 0  Pain management: adequate  Airway patency: patent  Anesthetic complications: no  Cardiovascular status: acceptable  Respiratory status: acceptable  Hydration status: acceptable  Comments: I have evaluated the patient and meets criteria for discharge from PACU. Rylee Hwang MD  Post anesthesia nausea and vomiting:  controlled  Final Post Anesthesia Temperature Assessment:  Normothermia (36.0-37.5 degrees C)      INITIAL Post-op Vital signs:   Vitals Value Taken Time   /88 11/10/22 1000   Temp 36.9 °C (98.4 °F) 11/10/22 0908   Pulse 78 11/10/22 1001   Resp 15 11/10/22 1001   SpO2 95 % 11/10/22 1001   Vitals shown include unvalidated device data.

## 2022-11-10 NOTE — BRIEF OP NOTE
Brief Postoperative Note    Patient: Chikis Gilmore  YOB: 1970  MRN: [id number]    Date of Procedure: 11/10/2022     Pre-Op Diagnosis: CLOSED FRACTURE OF OLECRANON PROCESSED OF LEFT ULNAR    Post-Op Diagnosis: Same as preoperative diagnosis. Procedure(s):  LEFT OLECRANON OPEN REDUCTION INTERNAL FIXATION (ANES. CHOICE, BLOCK)    Surgeon(s):  Mirna Romano MD    Surgical Assistant: Surg Asst-1: Lorena Mix RN    Anesthesia: Other     Estimated Blood Loss (mL): Minimal    Complications: None    Specimens: * No specimens in log *     Implants:   Implant Name Type Inv.  Item Serial No.  Lot No. LRB No. Used Action   PLATE BNE [id number] 2 H NONSTERILE L OLECRANON S STL LO PROF - SNA  PLATE BNE [id number] 2 H NONSTERILE L OLECRANON S STL LO PROF NA DEPUY SYNTHES [facility]  Left 1 Implanted   SCREW BNE L22MM DIA3.5MM TERESO S STL ST NONCANNULATED IVA - SNA  SCREW BNE L22MM DIA3.5MM TERESO S STL ST NONCANNULATED IVA NA DEPUY [employer] [facility]  Left 1 Implanted   SCREW BNE L10MM DIA3.5MM TERESO S STL ST NONCANNULATED IVA - SNA  SCREW BNE L10MM DIA3.5MM TERESO S STL ST NONCANNULATED IVA NA DEPUY [employer] [facility]  Left 1 Implanted   SCREW BNE L44MM DIA2.7MM ANK S STL ST [place] IVA FULL THRD - SNA  SCREW BNE L44MM DIA2.7MM ANK S STL ST [place] IVA FULL THRD NA DEPU[employer] [facility]  Left 2 Implanted   SCREW BNE L30MM DIA2.7MM ANK S STL ST [place] IVA FULL THRD - SNA  SCREW BNE L30MM DIA2.7MM ANK S STL ST [place] IVA FULL THRD NA DEPU[employer] [facility]  Left 1 Implanted   SCREW BNE L32MM DIA2.7MM S STL ST [place] IVA FULL THRD T8 - SNA  SCREW BNE L32MM DIA2.7MM S STL ST [place] IVA FULL THRD T8 NA DEPUY [employer] [facility]  Left 1 Implanted   SCREW BNE L16MM DIA2.7MM ANK S STL ST [place] IVA FULL THRD - SNA  SCREW BNE L16MM DIA2.7MM ANK S STL ST [place] IVA FULL THRD NA DEPUY [employer]  Left 1 Implanted   SCREW BNE L22MM DIA2.7MM ANK S STL ST [place] IVA FULL THRD - SNA  SCREW BNE L22MM DIA2.7MM ANK S STL ST [place] IVA FULL THRD NA DEPUY [employer]  Left 1 Implanted       Drains: * No LDAs found *    Findings: Comminuted left olecranon fracture    Electronically Signed by Julianne Wilks MD on 11/10/2022 at 7:23 AM

## 2022-11-21 ENCOUNTER — OFFICE VISIT (OUTPATIENT)
Dept: ORTHOPEDIC SURGERY | Age: 52
End: 2022-11-21
Payer: COMMERCIAL

## 2022-11-21 VITALS — WEIGHT: 169 LBS | HEIGHT: 68 IN | BODY MASS INDEX: 25.61 KG/M2

## 2022-11-21 DIAGNOSIS — S52.022D CLOSED FRACTURE OF OLECRANON PROCESS OF LEFT ULNA WITH ROUTINE HEALING, SUBSEQUENT ENCOUNTER: Primary | ICD-10-CM

## 2022-11-21 PROCEDURE — 99024 POSTOP FOLLOW-UP VISIT: CPT | Performed by: ORTHOPAEDIC SURGERY

## 2022-11-21 NOTE — PROGRESS NOTES
Aaron Griggs (: 1970) is a 45 y.o. female patient here for evaluation of the following chief complaint(s):  Arm Pain (Left ulna sx 11/10)       ASSESSMENT/PLAN:  Below is the assessment and plan developed based on review of pertinent history, physical exam, labs, studies, and medications. 1. Closed fracture of olecranon process of left ulna with routine healing, subsequent encounter  -     XR ELBOW LT AP/LAT; Future      24-year-old female following up after left olecranon ORIF doing well. Referred to physical therapy. Follow-up in 1 month with repeat x-rays 2 views of the left elbow. Patient verbalized understanding and elected to proceed. All questions were answered to the patient's apparent satisfaction. SUBJECTIVE/OBJECTIVE:    Patient is here today for a postoperative visit. Date of Surgery: 11/10/2022    Patient overall reports he is doing well with no numbness or tingling and minimal pain. Overall she is improving. No fevers or chills. Allergies   Allergen Reactions    Amoxicillin Rash       Current Outpatient Medications   Medication Sig    FLUoxetine (PROzac) 20 mg capsule Take 1 Capsule by mouth daily. ibuprofen (MOTRIN) 800 mg tablet Take 1 Tablet by mouth every six (6) hours as needed for Pain (take in between oxycodone doses if needed). amphetamine-dextroamphetamine XR (ADDERALL XR) 25 mg XR capsule Take 1 Capsule by mouth every morning. Max Daily Amount: 25 mg. No current facility-administered medications for this visit. Social History     Socioeconomic History    Marital status:      Spouse name: Not on file    Number of children: Not on file    Years of education: Not on file    Highest education level: Not on file   Occupational History    Occupation: unemployed at present   Tobacco Use    Smoking status: Never    Smokeless tobacco: Never   Vaping Use    Vaping Use: Never used   Substance and Sexual Activity    Alcohol use:  Yes Alcohol/week: 3.0 standard drinks     Types: 3 Glasses of wine per week    Drug use: Never    Sexual activity: Not Currently   Other Topics Concern    Not on file   Social History Narrative    Not on file     Social Determinants of Health     Financial Resource Strain: Not on file   Food Insecurity: Not on file   Transportation Needs: Not on file   Physical Activity: Not on file   Stress: Not on file   Social Connections: Not on file   Intimate Partner Violence: Not on file   Housing Stability: Not on file       Past Surgical History:   Procedure Laterality Date    HX BREAST AUGMENTATION  2006    HX CYST REMOVAL Right     Ganglion from foot    HX ORTHOPAEDIC Left 11/2018    head of radius ELBOW    HX ORTHOPAEDIC Left 11/2022    Fx elbow again    HX TUBAL LIGATION  2017    HX WISDOM TEETH EXTRACTION         Family History   Problem Relation Age of Onset    Mult Sclerosis Mother 66    Hypertension Father     High Cholesterol Father             Review of Systems    No flowsheet data found. Vitals:  Ht 5' 8\" (1.727 m)   Wt 169 lb (76.7 kg)   BMI 25.70 kg/m²    Estimated body surface area is 1.92 meters squared as calculated from the following:    Height as of this encounter: 5' 8\" (1.727 m). Weight as of this encounter: 169 lb (76.7 kg). Body mass index is 25.7 kg/m². Physical Exam    Musculoskeletal Exam:    Left Upper Extremity Exam:    Evaluation of the patient's postoperative site shows that the incision is intact and healing appropriately. There are no signs of infection, no redness, no warmth, no erythema. There is no purulent drainage noted. Patient fires AIN, PIN and ulnar nerves. Sensation is grossly intact in the median, radial and ulnar distribution. Hand is pink and appears well-perfused. Hand is warm. Intact flexion extension, pronation and supination of the elbow with minimal pain.       Consitutional: Healthy  Skin:   - Edema - mild  - Cellulitis - No    Neuro: Numbness or tingling in R/L arm: none    Psych: Affect normal    Cardiovascular: Capillary Refill < 2 seconds in upper extremities    Respiratory: Non-Labored Breathing      ROS:    Constitutional: Denies fever/chills    Respiratory: Denies SOB        Imaging:    XR Results (most recent):  Results from Appointment encounter on 11/21/22    XR ELBOW LT AP/LAT    Narrative  Left Elbow Xray:  Indication: pain  Views: 2, AP/LAT    Interpretation: 2 views of the left elbow are reviewed and show appropriate alignment of her olecranon fracture postoperatively. Hardware remains in appropriate position and fracture remains well aligned. Orders Placed This Encounter    XR ELBOW LT AP/LAT     Standing Status:   Future     Number of Occurrences:   1     Standing Expiration Date:   11/22/2023        Procedures:    Sutures removed today. An electronic signature was used to authenticate this note.   -- Alison Mora MD

## 2022-11-29 ENCOUNTER — OFFICE VISIT (OUTPATIENT)
Dept: ORTHOPEDIC SURGERY | Age: 52
End: 2022-11-29
Payer: COMMERCIAL

## 2022-11-29 DIAGNOSIS — S52.022D CLOSED FRACTURE OF OLECRANON PROCESS OF LEFT ULNA WITH ROUTINE HEALING, SUBSEQUENT ENCOUNTER: ICD-10-CM

## 2022-11-29 DIAGNOSIS — M25.522 PAIN IN LEFT ELBOW: Primary | ICD-10-CM

## 2022-11-29 NOTE — PROGRESS NOTES
Patient Initial Evaluation    Patient Name: Augustine Null  Date:2022  : 1970  [x]  Patient  Verified  Payor: Wilfredo Duffy / Plan: Amalia Daugherty PPO / Product Type: PPO /    Total Treatment Time (min): 40    Treatment Area: L elbow    HPI    The patient is a 57-year-old female referred to physical therapy by Dr. Aba Villa status post left olecranon ORIF 2022. Patient states that she initially fell about 4 days before then and broke her elbow. She has had a previous elbow fracture involving the radius that was surgically repaired in 2018. She states that she had full motion following that surgery. She is right-hand dominant. She states that her pain is pretty well controlled but does experience sharp pains with elbow extension. Patient states that [place] is very active [place]. She has a big project in which she will be planting 100 trees in January and her primary goal is to have full elbow mobility and strength by then to complete this project. OBJECTIVE    Range of motion: RUE WNL, LUE elbow flexion- 135 degrees, elbow extension: -10 degrees, elbow pronation- -8 degrees    Strength: RUE WNL, LUE strength grossly 5/5 except L elbow flexion/ extension 4/5    Pain: Reported a visual analog scale 2/10 at rest    Swelling: mild    Palpation: Tenderness with palpation proximal to left olecranon    Joint mobility assessment: Hypomobile with humeral ulnar glides    Soft tissue: Biceps and triceps restrictions    Incision: healing well, no erythema or warmth proximal to incision    QuickDASH: refer to chart    Special tests: not indicated    Treatment: Full evaluation of the patient was completed. Manual (minutes: 15): Treatment consisted of soft tissue mobilization of distal bicep and tricep. Grade 3 humeroulnar  mobilizations. PROM into left elbow flexion and extension. Therapeutic exercises (minutes: 10):  We also discussed at length progressive plan of care and goals with the patient to develop current plan. Home exercise program included elbow extension stretch, elbow flexion stretch, triceps with green T band, and biceps with green T band. Written and pictorial exercises were provided to the patient. We recommended utilization of ice and elevation for pain relief at home. Ice post-treatment. Total treatment time 40 minutes. ASSESSMENT    Physical examination reveals decreased left elbow flexion extension AROM and PROM, decreased L elbow strength, humeroulnar hypomobility, and decreased functional mobility. Patient will benefit from skilled therapy to address these limitations. Long-term goal 4 weeks  1. Patient will demonstrate full left elbow extension ROM without reproduced elbow pain. 2.  Patient will demonstrate full left elbow flexion ROM without reproduced elbow pain. 3.  L elbow extension strength 5/5.   4.  15 point improvement in Quick DASH. Short-term goal 1 weeks. 1.  Independent demonstration of a home exercise program to facilitate recovery. Total treatment time today 40 min. ICD-10-CM ICD-9-CM    1. Pain in left elbow  M25.522 719.42       2. Closed fracture of olecranon process of left ulna with routine healing, subsequent encounter  S52.022D V54.12         PLAN OF CARE:  Treatment frequency 2X weekly. Duration 20 visits. Focus of therapy will be on progressive restoration of range of motion and strength, balance, and functional mobility. Therapeutic applications will include but are not limited to:  Home exercise program development and implementation with updating as needed. Intramuscular dry needling to the involved region. Manual therapy, joint mobilization, myofascial release, therapeutic exercises. Modalities including ultrasound and electric stimulation heat and ice. Kinesiotape and Pagan taping for joint reeducation and approximation of tissue for neuromuscular reeducation.

## 2022-12-06 ENCOUNTER — OFFICE VISIT (OUTPATIENT)
Dept: ORTHOPEDIC SURGERY | Age: 52
End: 2022-12-06
Payer: COMMERCIAL

## 2022-12-06 DIAGNOSIS — M25.522 PAIN IN LEFT ELBOW: Primary | ICD-10-CM

## 2022-12-06 DIAGNOSIS — S52.022D CLOSED FRACTURE OF OLECRANON PROCESS OF LEFT ULNA WITH ROUTINE HEALING, SUBSEQUENT ENCOUNTER: ICD-10-CM

## 2022-12-06 NOTE — PROGRESS NOTES
PT DAILY TREATMENT NOTE    Patient Name: Virgen Arzola  Date:2022  : 1970  [x]  Patient  Verified  Payor: Piter Morataya / Plan: Princess Thakur PPO / Product Type: PPO /    Total Treatment Time (min): 55  Referring Provider: No ref. provider found    Treatment Area: L elbow    SUBJECTIVE  Subjective functional status/changes:   [] No changes reported    Patient states that her home exercises have been going well but she continues to have a sharp pain with elbow extension. OBJECTIVE    Therapeutic Exercise: (minutes: 30)    PT Exercise Log        Activity/Exercise Date  22    Activity/Exercise      UBE L2 8 min. X      Pulleys X     Triceps with green T band   X       Biceps with green T band X     Wrist extension bar with 1 LBS X     Elbow extension stretch with 1 LBS X                             Manual Therapy: (minutes: 25) Treatment consisted of scar mobilization. Soft tissue mobilization of left wrist extensors, left triceps, and left biceps. Humeroulnar distraction. ASSESSMENT  []  See Plan of Care  []  See progress note/recertification  [x]  Patient will continue to benefit from skilled therapy to address remaining functional deficits:     Patient responded well to all new exercises today. Following treatment she was able to demonstrate -5 to 140 degrees L elbow ROM. We will continue current plan with focus on restoring full left elbow AROM. ICD-10-CM ICD-9-CM    1. Pain in left elbow  M25.522 719.42       2.  Closed fracture of olecranon process of left ulna with routine healing, subsequent encounter  S52.022D V54.12         Progress towards goals / Updated goals:    PLAN  []  Upgrade activities as tolerated     [x]  Continue plan of care  []  Discharge due to:_  [] Other:_      Heather Begun, PT 2022  11:50 AM

## 2022-12-08 ENCOUNTER — OFFICE VISIT (OUTPATIENT)
Dept: ORTHOPEDIC SURGERY | Age: 52
End: 2022-12-08
Payer: COMMERCIAL

## 2022-12-08 DIAGNOSIS — S52.022D CLOSED FRACTURE OF OLECRANON PROCESS OF LEFT ULNA WITH ROUTINE HEALING, SUBSEQUENT ENCOUNTER: ICD-10-CM

## 2022-12-08 DIAGNOSIS — M25.522 PAIN IN LEFT ELBOW: Primary | ICD-10-CM

## 2022-12-08 NOTE — PROGRESS NOTES
PT DAILY TREATMENT NOTE    Patient Name: Franca Oakley  Date:2022  : 1970  [x]  Patient  Verified  Payor: Shelbie Pierce / Plan: Carrol Richardson PPO / Product Type: PPO /    Total Treatment Time (min): 55  Referring Provider: Vitaliy Meier*    Treatment Area: L elbow    SUBJECTIVE  Subjective functional status/changes:   [] No changes reported    Patient states that her elbow is improving but she continues to have sharp pains with elbow extension. OBJECTIVE    Therapeutic Exercise: (minutes: 30)    PT Exercise Log        Activity/Exercise Date  22    Activity/Exercise      UBE L2 8 min. X      Pulleys X     Triceps with green T band   X       Biceps with green T band X     Wrist extension bar with 1 LBS X     Elbow extension stretch with 1 LBS X                             Manual Therapy: (minutes: 25) Treatment consisted of scar mobilization. Soft tissue mobilization of left wrist extensors, left triceps, and left biceps. Humeroulnar distraction. ASSESSMENT  []  See Plan of Care  []  See progress note/recertification  [x]  Patient will continue to benefit from skilled therapy to address remaining functional deficits:     Patient is making good progress with elbow ROM. Following treatment she was able to demonstrate L elbow ROM -5 to 140 degrees. We will continue current plan with focus on restoring full ROM and gross LUE strength. ICD-10-CM ICD-9-CM    1. Pain in left elbow  M25.522 719.42       2.  Closed fracture of olecranon process of left ulna with routine healing, subsequent encounter  S52.022D V54.12         Progress towards goals / Updated goals:    PLAN  []  Upgrade activities as tolerated     [x]  Continue plan of care  []  Discharge due to:_  [] Other:_      Sabrina , PT 2022  11:50 AM

## 2022-12-13 ENCOUNTER — OFFICE VISIT (OUTPATIENT)
Dept: ORTHOPEDIC SURGERY | Age: 52
End: 2022-12-13
Payer: COMMERCIAL

## 2022-12-13 ENCOUNTER — TRANSCRIBE ORDER (OUTPATIENT)
Dept: SCHEDULING | Age: 52
End: 2022-12-13

## 2022-12-13 DIAGNOSIS — M25.522 PAIN IN LEFT ELBOW: Primary | ICD-10-CM

## 2022-12-13 DIAGNOSIS — Z12.31 OTHER SCREENING MAMMOGRAM: Primary | ICD-10-CM

## 2022-12-13 DIAGNOSIS — S52.022D CLOSED FRACTURE OF OLECRANON PROCESS OF LEFT ULNA WITH ROUTINE HEALING, SUBSEQUENT ENCOUNTER: ICD-10-CM

## 2022-12-13 NOTE — PROGRESS NOTES
PT DAILY TREATMENT NOTE    Patient Name: Matilda Bliss  LTBT:  : 1970  [x]  Patient  Verified  Payor: Blair Gilmore / Plan: [id number] PPO / Product Type: PPO /    Total Treatment Time (min): 60  Referring Provider: No ref. provider found    Treatment Area: L elbow    SUBJECTIVE  Subjective functional status/changes:   [] No changes reported    Patient states that she continues to experience sharp pain on the inside of her elbow when she extends it. OBJECTIVE    Therapeutic Exercise: (minutes: 40)    PT Exercise Log        Activity/Exercise Date  22    Activity/Exercise      UBE L2 8 min. X      Pulleys X     Triceps with green T band   X       Biceps with green T band X     Wrist extension bar with 1 LBS X     Elbow extension stretch with 1 LBS X     Rows with black tband X   DAP with 3 kg   X                 Manual Therapy: (minutes: 20) Treatment consisted of scar mobilization. Soft tissue mobilization of left wrist extensors, left triceps, and left biceps. Humeroulnar distraction. ASSESSMENT  []  See Plan of Care  []  See progress note/recertification  [x]  Patient will continue to benefit from skilled therapy to address remaining functional deficits:     Patient is making great progress with left elbow ROM. Following treatment she is able to demonstrate -3 to 145 degrees elbow ROM. Patient responded well to all exercise progressions today. We will follow current plan and follow-up next week. ICD-10-CM ICD-9-CM    1. Pain in left elbow  M25.522 719.42       2.  Closed fracture of olecranon process of left ulna with routine healing, subsequent encounter  S52.022D V54.12         Progress towards goals / Updated goals:    PLAN  []  Upgrade activities as tolerated     [x]  Continue plan of care  []  Discharge due to:_  [] Other:_      Angeline Romberg, PT 2022  11:50 AM

## 2022-12-20 ENCOUNTER — OFFICE VISIT (OUTPATIENT)
Dept: ORTHOPEDIC SURGERY | Age: 52
End: 2022-12-20
Payer: COMMERCIAL

## 2022-12-20 DIAGNOSIS — S52.022D CLOSED FRACTURE OF OLECRANON PROCESS OF LEFT ULNA WITH ROUTINE HEALING, SUBSEQUENT ENCOUNTER: ICD-10-CM

## 2022-12-20 DIAGNOSIS — M25.522 PAIN IN LEFT ELBOW: Primary | ICD-10-CM

## 2022-12-20 NOTE — PROGRESS NOTES
PT DAILY TREATMENT NOTE    Patient Name: Cipriano Whittaker  Date:2022  : 1970  [x]  Patient  Verified  Payor: Patricia Valentin / Plan: Shawn Walton PPO / Product Type: PPO /    Total Treatment Time (min): 60  Referring Provider: Otilio Cockayne, MD    Treatment Area: L elbow    SUBJECTIVE  Subjective functional status/changes:   [] No changes reported    Patient states that she is getting less sharp and shooting pains into her left elbow. OBJECTIVE    Therapeutic Exercise: (minutes: 40)    PT Exercise Log        Activity/Exercise Date  22    Activity/Exercise      UBE L2 8 min. X      Pulleys X     Triceps with green T band   X       Biceps with green T band X     Wrist extension bar with 1 LBS X     Elbow extension stretch with 1 LBS X     Rows with black tband X   DAP with 3 kg   X   Supine elbow ext 3 LBS   X           Manual Therapy: (minutes: 20) Treatment consisted of scar mobilization. Soft tissue mobilization of left wrist extensors, left triceps, and left biceps. Humeroulnar distraction. ASSESSMENT  []  See Plan of Care  []  See progress note/recertification  [x]  Patient will continue to benefit from skilled therapy to address remaining functional deficits:     Patient is making great progress with passive and active mobility. We will continue current plan with focus on progressing elbow flexor and extensor strengthening to progress toward long-term goals. ICD-10-CM ICD-9-CM    1. Pain in left elbow  M25.522 719.42       2.  Closed fracture of olecranon process of left ulna with routine healing, subsequent encounter  S52.022D V54.12         Progress towards goals / Updated goals:    PLAN  []  Upgrade activities as tolerated     [x]  Continue plan of care  []  Discharge due to:_  [] Other:_      Kaylynn Booth, PT 2022  11:50 AM

## 2022-12-22 ENCOUNTER — OFFICE VISIT (OUTPATIENT)
Dept: ORTHOPEDIC SURGERY | Age: 52
End: 2022-12-22
Payer: COMMERCIAL

## 2022-12-22 DIAGNOSIS — S52.022D CLOSED FRACTURE OF OLECRANON PROCESS OF LEFT ULNA WITH ROUTINE HEALING, SUBSEQUENT ENCOUNTER: ICD-10-CM

## 2022-12-22 DIAGNOSIS — M25.522 PAIN IN LEFT ELBOW: Primary | ICD-10-CM

## 2022-12-22 NOTE — PROGRESS NOTES
PT DAILY TREATMENT NOTE    Patient Name: Patricia Sanchez  Date:2022  : 1970  [x]  Patient  Verified  Payor: Td Pedersen / Plan: Ronaldo Soulier PPO / Product Type: PPO /    Total Treatment Time (min): 60  Referring Provider: Manoj Gabriel MD    Treatment Area: L elbow    SUBJECTIVE  Subjective functional status/changes:   [] No changes reported    Patient states that she continues to have some pain on the inside of their elbow. OBJECTIVE    Therapeutic Exercise: (minutes: 40)    PT Exercise Log        Activity/Exercise Date  22    Activity/Exercise      UBE L2 8 min. X      Pulleys X     Triceps with green T band   X       Biceps with green T band X     Wrist extension bar with 1 LBS X     Elbow extension stretch with 1 LBS X     Rows with black tband X   DAP with 3 kg   X   Supine elbow ext 3 LBS   X           Manual Therapy: (minutes: 20) Treatment consisted of scar mobilization. Soft tissue mobilization of left wrist extensors, left triceps, and left biceps. Humeroulnar distraction. ASSESSMENT  []  See Plan of Care  []  See progress note/recertification  [x]  Patient will continue to benefit from skilled therapy to address remaining functional deficits:     Patient has full left elbow ROM. We will continue plan with focus on restoring gross LUE strength. Plan to transition to independent HEP next week. ICD-10-CM ICD-9-CM    1. Pain in left elbow  M25.522 719.42       2.  Closed fracture of olecranon process of left ulna with routine healing, subsequent encounter  S52.022D V54.12         Progress towards goals / Updated goals:    PLAN  []  Upgrade activities as tolerated     [x]  Continue plan of care  []  Discharge due to:_  [] Other:_      Delma Wiley, PT 2022  11:50 AM

## 2022-12-29 ENCOUNTER — HOSPITAL ENCOUNTER (OUTPATIENT)
Dept: MAMMOGRAPHY | Age: 52
Discharge: HOME | End: 2022-12-29
Attending: PHYSICIAN ASSISTANT
Payer: COMMERCIAL

## 2022-12-29 ENCOUNTER — OFFICE VISIT (OUTPATIENT)
Dept: ORTHOPEDIC SURGERY | Age: 52
End: 2022-12-29
Payer: COMMERCIAL

## 2022-12-29 VITALS — WEIGHT: 169 LBS | HEIGHT: 68 IN | BODY MASS INDEX: 25.61 KG/M2

## 2022-12-29 DIAGNOSIS — Z12.31 OTHER SCREENING MAMMOGRAM: ICD-10-CM

## 2022-12-29 DIAGNOSIS — S52.022D CLOSED FRACTURE OF OLECRANON PROCESS OF LEFT ULNA WITH ROUTINE HEALING, SUBSEQUENT ENCOUNTER: Primary | ICD-10-CM

## 2022-12-29 PROCEDURE — 99024 POSTOP FOLLOW-UP VISIT: CPT | Performed by: ORTHOPAEDIC SURGERY

## 2022-12-29 PROCEDURE — 77067 SCR MAMMO BI INCL CAD: CPT

## 2022-12-29 NOTE — PROGRESS NOTES
Maria Aguilar (: 1970) is a 45 y.o. female patient here for evaluation of the following chief complaint(s):  Wrist Pain (Left wrist)       ASSESSMENT/PLAN:  Below is the assessment and plan developed based on review of pertinent history, physical exam, labs, studies, and medications. 1. Closed fracture of olecranon process of left ulna with routine healing, subsequent encounter  -     XR ELBOW LT AP/LAT; Future      57-year-old female following up after left olecranon ORIF doing well. Continue PT, follow-up in 2 months with repeat x-rays AP and lateral of the left elbow. Overall she is doing very well. Patient verbalized understanding and elected to proceed. All questions were answered to the patient's apparent satisfaction. SUBJECTIVE/OBJECTIVE:    Patient is here today for a postoperative visit. Date of Surgery: 11/10/2022    Mild pain around the area of the plate and olecranon. None over the fracture site. Overall doing great and doing well with therapy. Allergies   Allergen Reactions    Amoxicillin Rash       Current Outpatient Medications   Medication Sig    amphetamine-dextroamphetamine XR (ADDERALL XR) 25 mg XR capsule Take 1 Capsule by mouth every morning. Max Daily Amount: 25 mg. FLUoxetine (PROzac) 20 mg capsule Take 1 Capsule by mouth daily. ibuprofen (MOTRIN) 800 mg tablet Take 1 Tablet by mouth every six (6) hours as needed for Pain (take in between oxycodone doses if needed). No current facility-administered medications for this visit.        Social History     Socioeconomic History    Marital status:      Spouse name: Not on file    Number of children: Not on file    Years of education: Not on file    Highest education level: Not on file   Occupational History    Occupation: unemployed at present   Tobacco Use    Smoking status: Never    Smokeless tobacco: Never   Vaping Use    Vaping Use: Never used   Substance and Sexual Activity    Alcohol use: Yes     Alcohol/week: 3.0 standard drinks     Types: 3 Glasses of wine per week    Drug use: Never    Sexual activity: Not Currently   Other Topics Concern    Not on file   Social History Narrative    Not on file     Social Determinants of Health     Financial Resource Strain: Not on file   Food Insecurity: Not on file   Transportation Needs: Not on file   Physical Activity: Not on file   Stress: Not on file   Social Connections: Not on file   Intimate Partner Violence: Not on file   Housing Stability: Not on file       Past Surgical History:   Procedure Laterality Date    HX BREAST AUGMENTATION  2006    HX CYST REMOVAL Right     Ganglion from foot    HX ORTHOPAEDIC Left 11/2018    head of radius ELBOW    HX ORTHOPAEDIC Left 11/2022    Fx elbow again    HX TUBAL LIGATION  2017    HX WISDOM TEETH EXTRACTION      IMPLANT BREAST SILICONE/EQ         Family History   Problem Relation Age of Onset    Mult Sclerosis Mother 66    Hypertension Father     High Cholesterol Father             Review of Systems    No flowsheet data found. Vitals:  Ht 5' 8\" (1.727 m)   Wt 169 lb (76.7 kg)   BMI 25.70 kg/m²    Estimated body surface area is 1.92 meters squared as calculated from the following:    Height as of this encounter: 5' 8\" (1.727 m). Weight as of this encounter: 169 lb (76.7 kg). Body mass index is 25.7 kg/m². Physical Exam    Musculoskeletal Exam:    Left Upper Extremity Exam:    Evaluation of the patient's postoperative site shows that the incision is intact and healing appropriately. There are no signs of infection, no redness, no warmth, no erythema. There is no purulent drainage noted. Patient fires AIN, PIN and ulnar nerves. Sensation is grossly intact in the median, radial and ulnar distribution. Hand is pink and appears well-perfused. Hand is warm. Intact flexion extension, pronation and supination of the elbow with minimal pain.       Consitutional: Healthy  Skin:   - Edema - mild  - Cellulitis - No    Neuro: Numbness or tingling in R/L arm: none    Psych: Affect normal    Cardiovascular: Capillary Refill < 2 seconds in upper extremities    Respiratory: Non-Labored Breathing      ROS:    Constitutional: Denies fever/chills    Respiratory: Denies SOB        Imaging:    XR Results (most recent):  Results from Appointment encounter on 12/29/22    XR ELBOW LT AP/LAT    Narrative  Left Elbow Xray:  Indication: pain  Views: 2, AP/LAT    Interpretation: 2 views of the left elbow are reviewed and show appropriate alignment of her olecranon fracture postoperatively. Hardware remains in appropriate position and fracture remains well aligned. There is interval healing noted today. Orders Placed This Encounter    XR ELBOW LT AP/LAT     Standing Status:   Future     Number of Occurrences:   1     Standing Expiration Date:   12/30/2023            An electronic signature was used to authenticate this note.   -- Kristina Blevins MD

## 2023-05-29 RX ORDER — IBUPROFEN 800 MG/1
800 TABLET ORAL EVERY 6 HOURS PRN
COMMUNITY
Start: 2022-11-10

## 2023-05-29 RX ORDER — DEXTROAMPHETAMINE SACCHARATE, AMPHETAMINE ASPARTATE MONOHYDRATE, DEXTROAMPHETAMINE SULFATE AND AMPHETAMINE SULFATE 6.25; 6.25; 6.25; 6.25 MG/1; MG/1; MG/1; MG/1
25 CAPSULE, EXTENDED RELEASE ORAL
COMMUNITY
Start: 2023-04-18

## 2023-05-29 RX ORDER — FLUOXETINE HYDROCHLORIDE 20 MG/1
20 CAPSULE ORAL DAILY
COMMUNITY
Start: 2023-05-03

## 2023-06-28 NOTE — PROGRESS NOTES
Cele Akhtar (: 1970) is a 45 y.o. female patient here for evaluation of the following chief complaint(s):  Hand Pain (Left ulnar fracture) and Wrist Pain (Left ulnar fracture)       ASSESSMENT/PLAN:  Below is the assessment and plan developed based on review of pertinent history, physical exam, labs, studies, and medications. 1. Closed fracture of olecranon process of left ulna, initial encounter    I thoroughly reviewed treatment options with the patient and her . I discussed open reduction internal fixation for this problem. They were in agreement. All questions were answered. We discussed risks, benefits and alternatives to surgery. After thorough discussion ultimately the patient elected to proceed with operative intervention. We discussed the risks including but not limited to postoperative pain, swelling, bruising, bleeding, scarring, infection, damage to neurovascular structures. Risk of permanent or temporary loss of range of motion, need for additional surgery, rejection of foreign material such as metal, suture or other tissue. Plan is for left olecranon open reduction internal fixation. Patient verbalized understanding and elected to proceed. All questions were answered to the patient's apparent satisfaction. SUBJECTIVE/OBJECTIVE:  HPI    68-year-old female who sustained a fall yesterday in a gravel parking lot injuring her left elbow. She was seen in the emergency department where x-rays were obtained which show displaced olecranon fracture. Patient was examined by the orthopedic PA on-call and determine the abrasions and lacerations were superficial without concern for open fracture. Patient was splinted and then referred here. She reports pain is manageable, no numbness or tingling. Patient reports a sudden onset of symptoms. Duration of problem 1 day, 2022.   Symptom Severity 3/10  Symptom Frequency intermittent        Allergies   Allergen Reactions Amoxicillin Rash       Current Outpatient Medications   Medication Sig    HYDROcodone-acetaminophen (Norco) 5-325 mg per tablet Take 1 Tablet by mouth every four (4) hours as needed for Pain for up to 3 days. Max Daily Amount: 6 Tablets. doxycycline (VIBRA-TABS) 100 mg tablet Take 1 Tablet by mouth two (2) times a day for 7 days. amphetamine-dextroamphetamine XR (ADDERALL XR) 25 mg XR capsule Take 1 Capsule by mouth every morning. Max Daily Amount: 25 mg. FLUoxetine (PROzac) 20 mg capsule Take 1 Capsule by mouth daily. No current facility-administered medications for this visit. Social History     Socioeconomic History    Marital status:      Spouse name: Not on file    Number of children: Not on file    Years of education: Not on file    Highest education level: Not on file   Occupational History    Occupation:  for [employer]    Smoking status: Never    Smokeless tobacco: Never   Vaping Use    Vaping Use: Never used   Substance and Sexual Activity    Alcohol use:  Yes     Alcohol/week: 7.0 standard drinks     Types: 7 Glasses of wine per week    Drug use: Never    Sexual activity: Not Currently   Other Topics Concern    Not on file   Social History Narrative    Not on file     Social Determinants of Health     Financial Resource Strain: Not on file   Food Insecurity: Not on file   Transportation Needs: Not on file   Physical Activity: Not on file   Stress: Not on file   Social Connections: Not on file   Intimate Partner Violence: Not on file   Housing Stability: Not on file       Past Surgical History:   Procedure Laterality Date    HX BREAST AUGMENTATION  2006    HX CYST REMOVAL Right     Ganglion from foot    HX ORTHOPAEDIC Left 2018    ELBOW     HX TUBAL LIGATION  2017    HX WISDOM TEETH EXTRACTION         Family History   Problem Relation Age of Onset    Mult Sclerosis Mother 66    Hypertension Father     High Cholesterol Father             Review of Systems    No flowsheet data found. Vitals:  Ht 5' 8\" (1.727 m)   Wt 165 lb (74.8 kg)   BMI 25.09 kg/m²    Estimated body surface area is 1.89 meters squared as calculated from the following:    Height as of this encounter: 5' 8\" (1.727 m). Weight as of this encounter: 165 lb (74.8 kg). Body mass index is 25.09 kg/m². Physical Exam    Musculoskeletal Exam:    Left Upper Extremity EXAMINATION    Patient has tenderness to palpation at the olecranon, no tenderness over the humerus or shoulder, no tenderness in the distal forearm or wrist or hand. Patient fires AIN, PIN and ulnar nerves. Sensation is grossly intact in the median, radial and ulnar distribution. Hand is pink and appears well-perfused. Hand is warm. Skin is intact. Compartments are soft and compressible. Consitutional: Healthy  Skin:   - Edema - mild  - Cellulitis - No    Neuro: Numbness or tingling in R/L arm: No    Psych: Affect normal    Cardiovascular: Capillary Refill < 2 seconds in upper extremities    Respiratory: Non-Labored Breathing    ROS:    Constitutional: Denies fever/chills    Respiratory: Denies SOB        Imaging:    XR Results (maximum last 3): Results from Hospital Encounter encounter on 11/07/22    XR FOREARM LT AP/LAT    Narrative  EXAM: XR FOREARM LT AP/LAT,    EXAM: XR ELBOW LT MIN 3 V,    EXAM: XR WRIST LT AP/LAT/OBL MIN 3V    INDICATION: fall, swelling. COMPARISON: None. FINDINGS:    Left elbow, 3 views  Acute comminuted fracture through the trochlear notch of the ulna with marked  distraction of the major fracture fragments. Overall alignment of the elbow  joint is otherwise preserved, within the limits of suboptimal positioning. Screws and 2 internal fixation of radial head fracture. Extensive soft tissue  swelling and gas surrounding the elbow, most pronounced dorsally. Left forearm, 2 views  No additional acute fracture nor dislocation.  Soft tissues otherwise grossly  unremarkable. Left wrist, 3 views  No acute fracture nor dislocation. Soft tissue swelling and gas volarly may be  posttraumatic. Impression  1. Acute comminuted distracted proximal ulnar fracture. Overall alignment is  maintained. 2.  Internally fixed radial head fracture. 3.  No additional acute fracture nor dislocation of the forearm or wrist.      XR WRIST LT AP/LAT/OBL MIN 3V    Narrative  EXAM: XR FOREARM LT AP/LAT,    EXAM: XR ELBOW LT MIN 3 V,    EXAM: XR WRIST LT AP/LAT/OBL MIN 3V    INDICATION: fall, swelling. COMPARISON: None. FINDINGS:    Left elbow, 3 views  Acute comminuted fracture through the trochlear notch of the ulna with marked  distraction of the major fracture fragments. Overall alignment of the elbow  joint is otherwise preserved, within the limits of suboptimal positioning. Screws and 2 internal fixation of radial head fracture. Extensive soft tissue  swelling and gas surrounding the elbow, most pronounced dorsally. Left forearm, 2 views  No additional acute fracture nor dislocation. Soft tissues otherwise grossly  unremarkable. Left wrist, 3 views  No acute fracture nor dislocation. Soft tissue swelling and gas volarly may be  posttraumatic. Impression  1. Acute comminuted distracted proximal ulnar fracture. Overall alignment is  maintained. 2.  Internally fixed radial head fracture. 3.  No additional acute fracture nor dislocation of the forearm or wrist.      XR ELBOW LT MIN 3 V    Narrative  EXAM: XR FOREARM LT AP/LAT,    EXAM: XR ELBOW LT MIN 3 V,    EXAM: XR WRIST LT AP/LAT/OBL MIN 3V    INDICATION: fall, swelling. COMPARISON: None. FINDINGS:    Left elbow, 3 views  Acute comminuted fracture through the trochlear notch of the ulna with marked  distraction of the major fracture fragments. Overall alignment of the elbow  joint is otherwise preserved, within the limits of suboptimal positioning.   Screws and 2 internal fixation of radial head fracture. Extensive soft tissue  swelling and gas surrounding the elbow, most pronounced dorsally. Left forearm, 2 views  No additional acute fracture nor dislocation. Soft tissues otherwise grossly  unremarkable. Left wrist, 3 views  No acute fracture nor dislocation. Soft tissue swelling and gas volarly may be  posttraumatic. Impression  1. Acute comminuted distracted proximal ulnar fracture. Overall alignment is  maintained. 2.  Internally fixed radial head fracture. 3.  No additional acute fracture nor dislocation of the forearm or wrist.        An electronic signature was used to authenticate this note.   -- Lincoln Napoles MD

## (undated) DEVICE — REM POLYHESIVE ADULT PATIENT RETURN ELECTRODE: Brand: VALLEYLAB

## (undated) DEVICE — SCREW BNE L20MM DIA2.7MM ANK S STL ST VAR ANG LOK FULL THRD
Type: IMPLANTABLE DEVICE | Site: ELBOW | Status: NON-FUNCTIONAL
Removed: 2022-11-10

## (undated) DEVICE — COVER LT HNDL PLAS RIG 1 PER PK

## (undated) DEVICE — SLING ARM LIFETEC ORTH UNIV --

## (undated) DEVICE — BANDAGE,ELASTIC,ESMARK,STERILE,4"X9',LF: Brand: MEDLINE

## (undated) DEVICE — PADDING,UNDERCAST,COTTON, 3X4YD STERILE: Brand: MEDLINE

## (undated) DEVICE — PREP SKN CHLRAPRP APL 26ML STR --

## (undated) DEVICE — DRESSING,GAUZE,XEROFORM,CURAD,1"X8",ST: Brand: CURAD

## (undated) DEVICE — ROCKER SWITCH PENCIL BLADE ELECTRODE, HOLSTER: Brand: EDGE

## (undated) DEVICE — SOLUTION IRRIG 1000ML 0.9% SOD CHL USP POUR PLAS BTL

## (undated) DEVICE — GLOVE SURG SZ 85 L12IN FNGR THK79MIL GRN LTX FREE

## (undated) DEVICE — BIPOLAR FORCEPS CORD: Brand: VALLEYLAB

## (undated) DEVICE — 2.0MM DRILL BIT WITH DEPTH MARK/QC/140MM

## (undated) DEVICE — DISPOSABLE TOURNIQUET CUFF SINGLE BLADDER, DUAL PORT AND QUICK CONNECT CONNECTOR: Brand: COLOR CUFF

## (undated) DEVICE — SUTURE PROL SZ 3-0 L18IN NONABSORBABLE BLU L19MM PS-2 3/8 8687H

## (undated) DEVICE — BASIN ST MAJOR-NO CAUTERY: Brand: MEDLINE INDUSTRIES, INC.

## (undated) DEVICE — SPONGE LAP 18X18IN STRL -- 5/PK

## (undated) DEVICE — SUTURE VCRL SZ 2-0 L27IN ABSRB UD L26MM SH 1/2 CIR J417H

## (undated) DEVICE — INTENDED FOR TISSUE SEPARATION, AND OTHER PROCEDURES THAT REQUIRE A SHARP SURGICAL BLADE TO PUNCTURE OR CUT.: Brand: BARD-PARKER ® CARBON RIB-BACK BLADES

## (undated) DEVICE — PACK,BASIC,SIRUS,V: Brand: MEDLINE

## (undated) DEVICE — 3M™ IOBAN™ 2 ANTIMICROBIAL INCISE DRAPE 6640EZ: Brand: IOBAN™ 2

## (undated) DEVICE — SPONGE GZ W4XL4IN COT 12 PLY TYP VII WVN C FLD DSGN

## (undated) DEVICE — 2.5MM DRILL BIT/QC/GOLD/110MM

## (undated) DEVICE — BNDG ELAS HK LOOP 3X5YD NS -- MATRIX